# Patient Record
Sex: FEMALE | Race: WHITE | NOT HISPANIC OR LATINO | URBAN - METROPOLITAN AREA
[De-identification: names, ages, dates, MRNs, and addresses within clinical notes are randomized per-mention and may not be internally consistent; named-entity substitution may affect disease eponyms.]

---

## 2017-06-26 ENCOUNTER — OUTPATIENT (OUTPATIENT)
Dept: OUTPATIENT SERVICES | Facility: HOSPITAL | Age: 52
LOS: 1 days | End: 2017-06-26

## 2017-06-26 DIAGNOSIS — R07.9 CHEST PAIN, UNSPECIFIED: ICD-10-CM

## 2022-02-13 ENCOUNTER — EMERGENCY (EMERGENCY)
Facility: HOSPITAL | Age: 57
LOS: 1 days | Discharge: HOME | End: 2022-02-13
Attending: EMERGENCY MEDICINE | Admitting: EMERGENCY MEDICINE
Payer: COMMERCIAL

## 2022-02-13 VITALS
SYSTOLIC BLOOD PRESSURE: 138 MMHG | HEART RATE: 61 BPM | OXYGEN SATURATION: 100 % | RESPIRATION RATE: 16 BRPM | DIASTOLIC BLOOD PRESSURE: 81 MMHG

## 2022-02-13 VITALS
SYSTOLIC BLOOD PRESSURE: 142 MMHG | OXYGEN SATURATION: 100 % | RESPIRATION RATE: 18 BRPM | DIASTOLIC BLOOD PRESSURE: 80 MMHG | HEART RATE: 55 BPM | WEIGHT: 184.97 LBS | TEMPERATURE: 97 F | HEIGHT: 69 IN

## 2022-02-13 DIAGNOSIS — R10.13 EPIGASTRIC PAIN: ICD-10-CM

## 2022-02-13 DIAGNOSIS — R42 DIZZINESS AND GIDDINESS: ICD-10-CM

## 2022-02-13 DIAGNOSIS — K80.20 CALCULUS OF GALLBLADDER WITHOUT CHOLECYSTITIS WITHOUT OBSTRUCTION: ICD-10-CM

## 2022-02-13 DIAGNOSIS — R06.4 HYPERVENTILATION: ICD-10-CM

## 2022-02-13 DIAGNOSIS — Z20.822 CONTACT WITH AND (SUSPECTED) EXPOSURE TO COVID-19: ICD-10-CM

## 2022-02-13 LAB
ALBUMIN SERPL ELPH-MCNC: 3.9 G/DL — SIGNIFICANT CHANGE UP (ref 3.3–5)
ALP SERPL-CCNC: 141 U/L — HIGH (ref 40–120)
ALT FLD-CCNC: 25 U/L — SIGNIFICANT CHANGE UP (ref 10–45)
ANION GAP SERPL CALC-SCNC: 14 MMOL/L — SIGNIFICANT CHANGE UP (ref 5–17)
APTT BLD: 31.8 SEC — SIGNIFICANT CHANGE UP (ref 27.5–35.5)
AST SERPL-CCNC: 25 U/L — SIGNIFICANT CHANGE UP (ref 10–40)
BASOPHILS # BLD AUTO: 0.03 K/UL — SIGNIFICANT CHANGE UP (ref 0–0.2)
BASOPHILS NFR BLD AUTO: 0.3 % — SIGNIFICANT CHANGE UP (ref 0–2)
BILIRUB SERPL-MCNC: 0.3 MG/DL — SIGNIFICANT CHANGE UP (ref 0.2–1.2)
BLD GP AB SCN SERPL QL: NEGATIVE — SIGNIFICANT CHANGE UP
BUN SERPL-MCNC: 11 MG/DL — SIGNIFICANT CHANGE UP (ref 7–23)
CALCIUM SERPL-MCNC: 9.7 MG/DL — SIGNIFICANT CHANGE UP (ref 8.4–10.5)
CHLORIDE SERPL-SCNC: 102 MMOL/L — SIGNIFICANT CHANGE UP (ref 96–108)
CO2 SERPL-SCNC: 23 MMOL/L — SIGNIFICANT CHANGE UP (ref 22–31)
CREAT SERPL-MCNC: 1 MG/DL — SIGNIFICANT CHANGE UP (ref 0.5–1.3)
EOSINOPHIL # BLD AUTO: 0.02 K/UL — SIGNIFICANT CHANGE UP (ref 0–0.5)
EOSINOPHIL NFR BLD AUTO: 0.2 % — SIGNIFICANT CHANGE UP (ref 0–6)
GLUCOSE SERPL-MCNC: 192 MG/DL — HIGH (ref 70–99)
HCT VFR BLD CALC: 42.9 % — SIGNIFICANT CHANGE UP (ref 34.5–45)
HGB BLD-MCNC: 14.1 G/DL — SIGNIFICANT CHANGE UP (ref 11.5–15.5)
IMM GRANULOCYTES NFR BLD AUTO: 0.6 % — SIGNIFICANT CHANGE UP (ref 0–1.5)
INR BLD: 1.12 — SIGNIFICANT CHANGE UP (ref 0.88–1.16)
LIDOCAIN IGE QN: 46 U/L — SIGNIFICANT CHANGE UP (ref 7–60)
LYMPHOCYTES # BLD AUTO: 1.53 K/UL — SIGNIFICANT CHANGE UP (ref 1–3.3)
LYMPHOCYTES # BLD AUTO: 13.9 % — SIGNIFICANT CHANGE UP (ref 13–44)
MCHC RBC-ENTMCNC: 29.3 PG — SIGNIFICANT CHANGE UP (ref 27–34)
MCHC RBC-ENTMCNC: 32.9 GM/DL — SIGNIFICANT CHANGE UP (ref 32–36)
MCV RBC AUTO: 89.2 FL — SIGNIFICANT CHANGE UP (ref 80–100)
MONOCYTES # BLD AUTO: 0.41 K/UL — SIGNIFICANT CHANGE UP (ref 0–0.9)
MONOCYTES NFR BLD AUTO: 3.7 % — SIGNIFICANT CHANGE UP (ref 2–14)
NEUTROPHILS # BLD AUTO: 8.94 K/UL — HIGH (ref 1.8–7.4)
NEUTROPHILS NFR BLD AUTO: 81.3 % — HIGH (ref 43–77)
NRBC # BLD: 0 /100 WBCS — SIGNIFICANT CHANGE UP (ref 0–0)
PLATELET # BLD AUTO: 318 K/UL — SIGNIFICANT CHANGE UP (ref 150–400)
POTASSIUM SERPL-MCNC: 3.7 MMOL/L — SIGNIFICANT CHANGE UP (ref 3.5–5.3)
POTASSIUM SERPL-SCNC: 3.7 MMOL/L — SIGNIFICANT CHANGE UP (ref 3.5–5.3)
PROT SERPL-MCNC: 7.3 G/DL — SIGNIFICANT CHANGE UP (ref 6–8.3)
PROTHROM AB SERPL-ACNC: 13.4 SEC — SIGNIFICANT CHANGE UP (ref 10.6–13.6)
RBC # BLD: 4.81 M/UL — SIGNIFICANT CHANGE UP (ref 3.8–5.2)
RBC # FLD: 12.9 % — SIGNIFICANT CHANGE UP (ref 10.3–14.5)
RH IG SCN BLD-IMP: NEGATIVE — SIGNIFICANT CHANGE UP
SARS-COV-2 RNA SPEC QL NAA+PROBE: NEGATIVE — SIGNIFICANT CHANGE UP
SODIUM SERPL-SCNC: 139 MMOL/L — SIGNIFICANT CHANGE UP (ref 135–145)
TROPONIN T SERPL-MCNC: <0.01 NG/ML — SIGNIFICANT CHANGE UP (ref 0–0.01)
WBC # BLD: 11 K/UL — HIGH (ref 3.8–10.5)
WBC # FLD AUTO: 11 K/UL — HIGH (ref 3.8–10.5)

## 2022-02-13 PROCEDURE — 84484 ASSAY OF TROPONIN QUANT: CPT

## 2022-02-13 PROCEDURE — 76705 ECHO EXAM OF ABDOMEN: CPT

## 2022-02-13 PROCEDURE — 85025 COMPLETE CBC W/AUTO DIFF WBC: CPT

## 2022-02-13 PROCEDURE — 99285 EMERGENCY DEPT VISIT HI MDM: CPT | Mod: 25

## 2022-02-13 PROCEDURE — 86900 BLOOD TYPING SEROLOGIC ABO: CPT

## 2022-02-13 PROCEDURE — 96374 THER/PROPH/DIAG INJ IV PUSH: CPT

## 2022-02-13 PROCEDURE — 85610 PROTHROMBIN TIME: CPT

## 2022-02-13 PROCEDURE — 71045 X-RAY EXAM CHEST 1 VIEW: CPT

## 2022-02-13 PROCEDURE — 93005 ELECTROCARDIOGRAM TRACING: CPT

## 2022-02-13 PROCEDURE — 93010 ELECTROCARDIOGRAM REPORT: CPT | Mod: 59

## 2022-02-13 PROCEDURE — 71045 X-RAY EXAM CHEST 1 VIEW: CPT | Mod: 26

## 2022-02-13 PROCEDURE — 85730 THROMBOPLASTIN TIME PARTIAL: CPT

## 2022-02-13 PROCEDURE — 36415 COLL VENOUS BLD VENIPUNCTURE: CPT

## 2022-02-13 PROCEDURE — 87635 SARS-COV-2 COVID-19 AMP PRB: CPT

## 2022-02-13 PROCEDURE — 76705 ECHO EXAM OF ABDOMEN: CPT | Mod: 26

## 2022-02-13 PROCEDURE — 80053 COMPREHEN METABOLIC PANEL: CPT

## 2022-02-13 PROCEDURE — 86850 RBC ANTIBODY SCREEN: CPT

## 2022-02-13 PROCEDURE — 96375 TX/PRO/DX INJ NEW DRUG ADDON: CPT

## 2022-02-13 PROCEDURE — 83690 ASSAY OF LIPASE: CPT

## 2022-02-13 PROCEDURE — 86901 BLOOD TYPING SEROLOGIC RH(D): CPT

## 2022-02-13 RX ORDER — ACETAMINOPHEN 500 MG
650 TABLET ORAL ONCE
Refills: 0 | Status: COMPLETED | OUTPATIENT
Start: 2022-02-13 | End: 2022-02-13

## 2022-02-13 RX ORDER — PANTOPRAZOLE SODIUM 20 MG/1
40 TABLET, DELAYED RELEASE ORAL ONCE
Refills: 0 | Status: COMPLETED | OUTPATIENT
Start: 2022-02-13 | End: 2022-02-13

## 2022-02-13 RX ORDER — SODIUM CHLORIDE 9 MG/ML
1000 INJECTION INTRAMUSCULAR; INTRAVENOUS; SUBCUTANEOUS ONCE
Refills: 0 | Status: COMPLETED | OUTPATIENT
Start: 2022-02-13 | End: 2022-02-13

## 2022-02-13 RX ORDER — ONDANSETRON 8 MG/1
4 TABLET, FILM COATED ORAL ONCE
Refills: 0 | Status: COMPLETED | OUTPATIENT
Start: 2022-02-13 | End: 2022-02-13

## 2022-02-13 RX ADMIN — ONDANSETRON 4 MILLIGRAM(S): 8 TABLET, FILM COATED ORAL at 01:55

## 2022-02-13 RX ADMIN — PANTOPRAZOLE SODIUM 40 MILLIGRAM(S): 20 TABLET, DELAYED RELEASE ORAL at 01:55

## 2022-02-13 RX ADMIN — Medication 30 MILLILITER(S): at 01:55

## 2022-02-13 RX ADMIN — SODIUM CHLORIDE 1000 MILLILITER(S): 9 INJECTION INTRAMUSCULAR; INTRAVENOUS; SUBCUTANEOUS at 01:55

## 2022-02-13 RX ADMIN — Medication 650 MILLIGRAM(S): at 01:55

## 2022-02-13 NOTE — CONSULT NOTE ADULT - ASSESSMENT
56F with pmh of GERD, hiatal hernia, IBS-C and psh of dx laparoscopy who presents from home with acute on chronic epigastric pain with associated nausea and vomiting. Vitally wnl, with labs wnl- including TBili 0.3, Elevated alkaline phosphatase suggestive of possible biliary obstruction seen on Abd US. Considered on the differential is symptomatic cholelithiasis (non-mobile 1.4cm stone) vs choledocholithiasis.     Patient offered laparoscopic cholecystectomy during this admission vs outpt f/u for elective procedure and elected the later  Recommend outpatient follow up with Dr. Boss for MRCP and laparoscopic cholecystectomy  Plan was discussed with attending, ED physician, and patient and all were in agreement  Ok to discharge from surgical perspective

## 2022-02-13 NOTE — CONSULT NOTE ADULT - NSCONSULTADDITIONALINFOA_GEN_ALL_CORE
ACS Attending: Discussed with Dr. Tavarez. Have recommended admission and treatment. Patient wishes to pursue treatment as outpatient.

## 2022-02-13 NOTE — ED ADULT NURSE REASSESSMENT NOTE - NS ED NURSE REASSESS COMMENT FT1
IV has been initiated, labs drawn and sent along with covid swab, medicated by PIPPA Irwin with IVF, walked to US.

## 2022-02-13 NOTE — ED ADULT NURSE NOTE - OBJECTIVE STATEMENT
Pt presents with c/o "upper epigastric pain", described as squeezing sensation per pt, sudden onset at approx 2200. Pt reports significant hx of GERD, unable to take meds after onset of s/s secondary to "N/V" per pt. Pt presents very anxious, states "I am hyperventilating". During assessment, pt reports previous dx of hiatal hernia, but states current s/s are consistent with GERD. Denies any specific "chest pain" or other associated cardiac complaints.

## 2022-02-13 NOTE — ED PROVIDER NOTE - NSFOLLOWUPINSTRUCTIONS_ED_ALL_ED_FT
You ultrasound shows gallstones - some of which are stick in the neck of the gallbladder. Additionally some of ducts are dilated. You preferred to follow up outside the hospital.     Can take tylenol 650mg every 6hrs as needed for mild pain.    Avoid fried/fatty food.     Stay well hydrated.    Return for any fevers, persistent vomit, worsening pain, worsening breathing, worsening lightheaded.    Follow up with primary doctor within 1-2 days.     Follow up with general surgeon Dr. Boss.       Gallstones    Gallstones (cholelithiasis) is a form of gallbladder disease in which stones form in your gallbladder. The gallbladder is an organ that stores bile made in the liver, which helps digest fats. Gallstones begin as small bile crystals and slowly grow into stones. Gallstone pain occurs when the gallbladder spasms and a gallstone or sludge is blocking the duct. Pain can also occur when a stone passes out of the duct. Only take over-the-counter or prescription medicines for pain, discomfort, or fever as directed by your health care provider. Follow a low-fat diet until seen again by your health care provider.     SEEK IMMEDIATE MEDICAL CARE IF YOU HAVE ANY OF THE FOLLOWING SYMPTOMS: worsening pain, fever, persistent vomiting, yellowing of the skin or eyes, or altered mental status.

## 2022-02-13 NOTE — ED ADULT TRIAGE NOTE - CHIEF COMPLAINT QUOTE
Pt presented to the ED with complaints of epigastric pain associated with nausea and vomiting that began 10pm last night.

## 2022-02-13 NOTE — ED PROVIDER NOTE - PATIENT PORTAL LINK FT
You can access the FollowMyHealth Patient Portal offered by [facility] by registering at the following website: [email or web address]. By joining [employer]’s FollowMyHealth portal, you will also be able to view your health information using other applications (apps) compatible with our system.

## 2022-02-13 NOTE — ED PROVIDER NOTE - PHYSICAL EXAMINATION
no LE edema, normal equal distal pulses, steady unassisted gait.   abd: soft, +epigastric/RUQ ttp, no rebound/guarding.

## 2022-02-13 NOTE — ED PROVIDER NOTE - CARE PROVIDER_API CALL
Villa Boss (MD)  Surgery  [address], [facility], [place] [address]  Phone: [phone]  Fax: [phone]  Follow Up Time:

## 2022-02-13 NOTE — CONSULT NOTE ADULT - SUBJECTIVE AND OBJECTIVE BOX
HPI:  56F with pmh of GERD, hiatal hernia, IBS-C and psh of dx laparoscopy who presents from home with acute on chronic epigastric pain with associated nausea and vomiting. Reports chronic history of epigastric discomfort 2/2 GERD that progressed in severity over the past two weeks. Reports episode of severe pain las Tuesday that was self-limiting and     In the ED initial vitals were as follows- Tmax 97.3, HR 61, /81, RR 16, SpO2 100%. Initial labs notable for mild leukocytosis 11 (PMNs predominance) and Alp 141. Remaining labs wnl including Tbili 0.3, lipase 46. Abd US notable for mild cholelithiasis including a nonmobile stone in the region of the gallbladder neck. Mild gallbladder dilatation. No wall thickening or pericholecystic fluid to suggest acute cholecystitis.       PMH: GERD, hiatal hernia, IBS-C  PSH: Diagnostic laparoscopy for endometriosis  Allergies: NKDA  Medications: Linsezz  SH: No h/o tobacco use. Couple drinks 3-4xs/week. No h/o recreational drug use  FH: Father with cholelithiasis        Vital Signs Last 24 Hrs  T(C): 36.3 (13 Feb 2022 01:16), Max: 36.3 (13 Feb 2022 01:16)  T(F): 97.3 (13 Feb 2022 01:16), Max: 97.3 (13 Feb 2022 01:16)  HR: 61 (13 Feb 2022 04:10) (55 - 61)  BP: 138/81 (13 Feb 2022 04:10) (138/81 - 142/80)  BP(mean): --  RR: 16 (13 Feb 2022 04:10) (16 - 18)  SpO2: 100% (13 Feb 2022 04:10) (100% - 100%)  I&O's Detail    PHYSICAL EXAM  General: NAD, resting comfortably in bed  C/V: NSR  Pulm: Nonlabored breathing, no respiratory distress  Abd: soft, nondistende, NTTP. No rebound or guarding. Cole's negative  Extrem: WWP, no edema  Skin: warm, no rashes  Psych: anxious, appropriate        LABS:                        14.1   11.00 )-----------( 318      ( 13 Feb 2022 01:43 )             42.9     02-13    139  |  102  |  11  ----------------------------<  192<H>  3.7   |  23  |  1.00    Ca    9.7      13 Feb 2022 01:43    TPro  7.3  /  Alb  3.9  /  TBili  0.3  /  DBili  x   /  AST  25  /  ALT  25  /  AlkPhos  141<H>  02-13    PT/INR - ( 13 Feb 2022 04:14 )   PT: 13.4 sec;   INR: 1.12          PTT - ( 13 Feb 2022 04:14 )  PTT:31.8 sec      RADIOLOGY & ADDITIONAL STUDIES:  < from: US Abdomen Limited (02.13.22 @ 02:19) >  ACC: [id number] EXAM:  US ABDOMEN LIMITED                          PROCEDURE DATE:  02/13/2022          INTERPRETATION:  Right upper quadrant ultrasound    History: Epigastric pain and tenderness to palpation. Evaluate for   cholecystitis.    Prior studies: None.    Findings:    Liver: Normal size. Normal echogenicity. 0.8 cm cyst in the right lobe.    Bile ducts: No intrahepatic biliary ductal dilatation. The common duct is   dilated, measuring up to 1.0 cm distally.    Gallbladder: Few stones, including a 1.4 cm nonmobile in the region of   the gallbladder neck. Mild gallbladder dilatation measuring 11 cm in   length and 4 cm in transverse diameter. No wall thickening. No   pericholecystic fluid. Unable to assess for sonographic Cole's sign   because patient had received prior analgesia.    Pancreas: The visualized portions appear normal.    Right kidney: Normal size, measuring 11.2 cm in length. Normal renal   parenchymal thickness and echogenicity.  No hydronephrosis.  No renal   mass. No renal stone.    Ascites: No ascites in the right upper quadrant.    Vessels: The proximal portions of the aorta and inferior vena cava are   unremarkable. Normal hepatopetal blood flow demonstrated within main   portal vein. Hepatic veins are patent.    Impression:  1.  Mild cholelithiasis including a nonmobile stone in the region of the   gallbladder neck. Mild gallbladder dilatation. No wall thickening or   pericholecystic fluid to suggest acute cholecystitis. Unable to assess   for sonographic Cole's sign due to prior analgesia administration.   Consider further evaluation with nuclear hepatobiliary scan as clinically   warranted.  2.  Common bile duct dilatation up to 10 mm. Nonemergent follow-up with   MRCP is suggested.    --- End of Report ---          FRACISCO RAPP MD; Resident Radiologist  This document has been electronically signed.  JULIA LAM MD; Attending Radiologist  This document has been electronically signed. Feb 13 2022  3:12AM    < end of copied text >       HPI:  56F with pmh of GERD, hiatal hernia, IBS-C and psh of dx laparoscopy who presents from home with acute on chronic epigastric pain with associated nausea and vomiting. Reports chronic history of epigastric discomfort 2/2 GERD that progressed in severity over the past two weeks. Reports episode of severe pain last Tuesday that was self-limiting. Has repeat episode of severe pain yesterday evening prompting visit to the ED. Pain was constant, associated with NBNB emesis. Denies f/c, CP, SOB. Last BM was yesterday, loose and light brown. Denies hematuria or dysuria. Underwent EGD/colonoscopy 2 years ago which was notable for hiatal hernia, and colonic polyps x2.     In the ED initial vitals were as follows- Tmax 97.3, HR 61, /81, RR 16, SpO2 100%. Initial labs notable for mild leukocytosis 11 (PMNs predominance) and Alp 141. Remaining labs wnl including Tbili 0.3, lipase 46. Abd US notable for mild cholelithiasis including a nonmobile stone in the region of the gallbladder neck. Mild gallbladder dilatation. No wall thickening or pericholecystic fluid to suggest acute cholecystitis.       PMH: GERD, hiatal hernia, IBS-C  PSH: Diagnostic laparoscopy for endometriosis  Allergies: NKDA  Medications: Linsezz  SH: No h/o tobacco use. Couple drinks 3-4xs/week. No h/o recreational drug use  FH: Father with cholelithiasis        Vital Signs Last 24 Hrs  T(C): 36.3 (13 Feb 2022 01:16), Max: 36.3 (13 Feb 2022 01:16)  T(F): 97.3 (13 Feb 2022 01:16), Max: 97.3 (13 Feb 2022 01:16)  HR: 61 (13 Feb 2022 04:10) (55 - 61)  BP: 138/81 (13 Feb 2022 04:10) (138/81 - 142/80)  BP(mean): --  RR: 16 (13 Feb 2022 04:10) (16 - 18)  SpO2: 100% (13 Feb 2022 04:10) (100% - 100%)  I&O's Detail    PHYSICAL EXAM  General: NAD, resting comfortably in bed  C/V: NSR  Pulm: Nonlabored breathing, no respiratory distress  Abd: soft, nondistende, NTTP. No rebound or guarding. Cole's negative  Extrem: WWP, no edema  Skin: warm, no rashes  Psych: anxious, appropriate        LABS:                        14.1   11.00 )-----------( 318      ( 13 Feb 2022 01:43 )             42.9     02-13    139  |  102  |  11  ----------------------------<  192<H>  3.7   |  23  |  1.00    Ca    9.7      13 Feb 2022 01:43    TPro  7.3  /  Alb  3.9  /  TBili  0.3  /  DBili  x   /  AST  25  /  ALT  25  /  AlkPhos  141<H>  02-13    PT/INR - ( 13 Feb 2022 04:14 )   PT: 13.4 sec;   INR: 1.12          PTT - ( 13 Feb 2022 04:14 )  PTT:31.8 sec      RADIOLOGY & ADDITIONAL STUDIES:  < from: US Abdomen Limited (02.13.22 @ 02:19) >  ACC: [id number] EXAM:  US ABDOMEN LIMITED                          PROCEDURE DATE:  02/13/2022          INTERPRETATION:  Right upper quadrant ultrasound    History: Epigastric pain and tenderness to palpation. Evaluate for   cholecystitis.    Prior studies: None.    Findings:    Liver: Normal size. Normal echogenicity. 0.8 cm cyst in the right lobe.    Bile ducts: No intrahepatic biliary ductal dilatation. The common duct is   dilated, measuring up to 1.0 cm distally.    Gallbladder: Few stones, including a 1.4 cm nonmobile in the region of   the gallbladder neck. Mild gallbladder dilatation measuring 11 cm in   length and 4 cm in transverse diameter. No wall thickening. No   pericholecystic fluid. Unable to assess for sonographic Cole's sign   because patient had received prior analgesia.    Pancreas: The visualized portions appear normal.    Right kidney: Normal size, measuring 11.2 cm in length. Normal renal   parenchymal thickness and echogenicity.  No hydronephrosis.  No renal   mass. No renal stone.    Ascites: No ascites in the right upper quadrant.    Vessels: The proximal portions of the aorta and inferior vena cava are   unremarkable. Normal hepatopetal blood flow demonstrated within main   portal vein. Hepatic veins are patent.    Impression:  1.  Mild cholelithiasis including a nonmobile stone in the region of the   gallbladder neck. Mild gallbladder dilatation. No wall thickening or   pericholecystic fluid to suggest acute cholecystitis. Unable to assess   for sonographic Cole's sign due to prior analgesia administration.   Consider further evaluation with nuclear hepatobiliary scan as clinically   warranted.  2.  Common bile duct dilatation up to 10 mm. Nonemergent follow-up with   MRCP is suggested.    --- End of Report ---          FRACISCO RAPP MD; Resident Radiologist  This document has been electronically signed.  JULIA LAM MD; Attending Radiologist  This document has been electronically signed. Feb 13 2022  3:12AM    < end of copied text >

## 2022-02-13 NOTE — ED PROVIDER NOTE - CLINICAL SUMMARY MEDICAL DECISION MAKING FREE TEXT BOX
56F PMH hiatal hernia p/w epigastric pain, began ~2130, gradual onset, non-radiating, similar to prior but lasting longer, last episode ~1w ago. +NBNB NV. Feels like she is hyperventilating, feels that her mouth is dry and has slight lightheaded. No other systemic symptoms. Last EGD ~2yrs ago, reportedly showing hiatal hernia but otherwise wnl.   Vitals wnl, exam as above.   ddx: Likely GERD/gastritis/PUD vs. viv vs. less likely atypical ACS.   Labs, EKG, CXR, US, IVF/symptom control, reassess.

## 2022-02-13 NOTE — ED PROVIDER NOTE - PROGRESS NOTE DETAILS
Klepfish: WBC 11, glucose 192, alk phos 141, other labs grosslhy wnl. COVID neg. US prelim showing "Impression: 1. Cholelithiasis with nonmobile gallstones in the gallbladder neck causing mild gallbladder distention. 2. Common bile duct dilatation which may be secondary to choledocholithiasis, not well seen in this study. MRCP/ERCP can be performed for further evaluation. 3. No cholecystitis." Pt now though asymptomatic, abd soft NTND. Will continue to observe in ED, reassess. Tom: pt has no current pain - however given prior pain/NV and US findings, surgery consulted. Updated pt. Klepfish: asymptomatic since initial eval/pain meds. Evaluated by surgery, given US findings admission was recommended. Pt prefers outpt f/u. Has clinical capacity. Understands risks of possible blockage/developing infection.   Discussed importance of outpt follow up and return precautions. Clinically no indication for further emergent ED workup or hospitalization at this time. Comfortable for dc, outpt f/u.

## 2022-02-13 NOTE — ED PROVIDER NOTE - OBJECTIVE STATEMENT
56F PMH hiatal hernia p/w epigastric pain, began ~2130, gradual onset, non-radiating, similar to prior but lasting longer, last episode ~1w ago. +NBNB NV. Feels like she is hyperventilating, feels that her mouth is dry and has slight lightheaded. No other systemic symptoms. Last EGD ~2yrs ago, reportedly showing hiatal hernia but otherwise wnl.   Denies fevers, chills, diarrhea, black stool, bloody stool, dysuria, hematuria, urinary frequency, focal weakness/numbness, back pain, rashes, joint pains, recent travel, recent antibiotic use, sick contacts, SOB, CP, rhinorrhea, nasal congestion, sore throat, cough.   meds: Linzess PRN for constipation

## 2022-02-28 ENCOUNTER — APPOINTMENT (OUTPATIENT)
Dept: SURGERY | Facility: CLINIC | Age: 57
End: 2022-02-28
Payer: COMMERCIAL

## 2022-02-28 VITALS
OXYGEN SATURATION: 98 % | DIASTOLIC BLOOD PRESSURE: 78 MMHG | BODY MASS INDEX: 27.01 KG/M2 | HEART RATE: 64 BPM | WEIGHT: 182.38 LBS | TEMPERATURE: 96.5 F | HEIGHT: 69 IN | SYSTOLIC BLOOD PRESSURE: 135 MMHG

## 2022-02-28 DIAGNOSIS — K80.20 CALCULUS OF GALLBLADDER W/OUT CHOLECYSTITIS W/OUT OBSTRUCTION: ICD-10-CM

## 2022-02-28 DIAGNOSIS — K83.8 OTHER SPECIFIED DISEASES OF BILIARY TRACT: ICD-10-CM

## 2022-02-28 DIAGNOSIS — K83.1 OBSTRUCTION OF BILE DUCT: ICD-10-CM

## 2022-02-28 PROCEDURE — 99214 OFFICE O/P EST MOD 30 MIN: CPT

## 2022-03-03 ENCOUNTER — OUTPATIENT (OUTPATIENT)
Dept: OUTPATIENT SERVICES | Facility: HOSPITAL | Age: 57
LOS: 1 days | End: 2022-03-03
Payer: COMMERCIAL

## 2022-03-03 ENCOUNTER — RESULT REVIEW (OUTPATIENT)
Age: 57
End: 2022-03-03

## 2022-03-03 ENCOUNTER — APPOINTMENT (OUTPATIENT)
Dept: SURGERY | Facility: CLINIC | Age: 57
End: 2022-03-03
Payer: COMMERCIAL

## 2022-03-03 VITALS
HEIGHT: 69 IN | TEMPERATURE: 97.6 F | OXYGEN SATURATION: 99 % | WEIGHT: 183 LBS | HEART RATE: 61 BPM | DIASTOLIC BLOOD PRESSURE: 71 MMHG | SYSTOLIC BLOOD PRESSURE: 103 MMHG | BODY MASS INDEX: 27.11 KG/M2

## 2022-03-03 DIAGNOSIS — Z82.3 FAMILY HISTORY OF STROKE: ICD-10-CM

## 2022-03-03 DIAGNOSIS — Z87.42 PERSONAL HISTORY OF OTHER DISEASES OF THE FEMALE GENITAL TRACT: ICD-10-CM

## 2022-03-03 DIAGNOSIS — K21.9 GASTRO-ESOPHAGEAL REFLUX DISEASE W/OUT ESOPHAGITIS: ICD-10-CM

## 2022-03-03 PROCEDURE — 99205 OFFICE O/P NEW HI 60 MIN: CPT

## 2022-03-03 PROCEDURE — 99215 OFFICE O/P EST HI 40 MIN: CPT

## 2022-03-03 PROCEDURE — 71046 X-RAY EXAM CHEST 2 VIEWS: CPT

## 2022-03-03 PROCEDURE — 71046 X-RAY EXAM CHEST 2 VIEWS: CPT | Mod: 26

## 2022-03-07 ENCOUNTER — OUTPATIENT (OUTPATIENT)
Dept: OUTPATIENT SERVICES | Facility: HOSPITAL | Age: 57
LOS: 1 days | Discharge: HOME | End: 2022-03-07
Payer: COMMERCIAL

## 2022-03-07 PROCEDURE — 91010 ESOPHAGUS MOTILITY STUDY: CPT | Mod: 26

## 2022-03-07 PROCEDURE — 91013 ESOPHGL MOTIL W/STIM/PERFUS: CPT | Mod: 26

## 2022-03-07 PROCEDURE — 91010 ESOPHAGUS MOTILITY STUDY: CPT

## 2022-03-08 ENCOUNTER — APPOINTMENT (OUTPATIENT)
Dept: RADIOLOGY | Facility: HOSPITAL | Age: 57
End: 2022-03-08
Payer: COMMERCIAL

## 2022-03-08 ENCOUNTER — OUTPATIENT (OUTPATIENT)
Dept: OUTPATIENT SERVICES | Facility: HOSPITAL | Age: 57
LOS: 1 days | End: 2022-03-08
Payer: COMMERCIAL

## 2022-03-08 PROBLEM — K83.1 OBSTRUCTION OF BILE DUCT: Status: ACTIVE | Noted: 2022-02-28

## 2022-03-08 PROCEDURE — 74240 X-RAY XM UPR GI TRC 1CNTRST: CPT

## 2022-03-08 PROCEDURE — 74240 X-RAY XM UPR GI TRC 1CNTRST: CPT | Mod: 26

## 2022-03-08 NOTE — HISTORY OF PRESENT ILLNESS
[de-identified] : This is a 55 y/o female presenting to the office for evaluation and management of a large hiatal hernia. Reports she has had the hernia for many years with no symptoms. She reports she has had symptoms of GERD for many years which was relieved with dietary changes. In the past few months she has started to have symptoms of regurgitation with chest pain. Denies any shortness of breath or trouble breathing. Does not take any medication. Pt also recently had a ultrasound which revealed large enlarged bile duct and gallstones. Denies any fever, abdominal pain, nausea or vomiting.

## 2022-03-08 NOTE — ASSESSMENT
[FreeTextEntry1] : Case discussed/pt seen by attending, . 55 y/o female with large hiatal hernia, cholelithiasis and CBD dilation. Discussed plan for pt to undergo an MRCP to evaluate for bile duct obstruction. Will call pt with results. Discussed referral to  for further evaluation/management of hiatal hernia. All questions answered. Pt scheduled to see  this week. Notably greater than 50% of today's 30 minute initial visit was spent on counseling and coordination of her care.

## 2022-03-08 NOTE — DATA REVIEWED
[FreeTextEntry1] : EGD (12/4/15): Large hitatus hernia\par \par Ultrasound (2/13/22):\par 1. Mild cholelithiasis including a nonmobile stone in the region of the gallbladder neck. Mild gallbladder dilatation. No wall thickening or pericholecystic fluid to suggest acute cholecystitis. Unable to assess for sonographic Cole's sign due to prior analgesia administration. Consider further evaluation with nuclear hepatobiliary scan as clinically warranted.\par 2. Common bile duct dilatation up to 10 mm. Nonemergent follow-up with MRCP is suggested.

## 2022-03-08 NOTE — PHYSICAL EXAM
[Alert] : alert [Oriented to Person] : oriented to person [Oriented to Place] : oriented to place [Oriented to Time] : oriented to time [Calm] : calm [Abdominal Masses] : No abdominal masses [Abdomen Tenderness] : ~T ~M No abdominal tenderness [Tender] : was nontender [Enlarged] : not enlarged [de-identified] : NAD, comfortable [de-identified] : Normocephalic, atraumatic. No scleral icterus.  [de-identified] : Supple, no JVD or cervical lymphadenopathy.  [de-identified] : No respiratory distress.  [de-identified] : +BS soft, nontender, nondistended. Well healed prior incisions. \par

## 2022-03-22 ENCOUNTER — APPOINTMENT (OUTPATIENT)
Dept: MRI IMAGING | Facility: HOSPITAL | Age: 57
End: 2022-03-22

## 2022-03-22 ENCOUNTER — OUTPATIENT (OUTPATIENT)
Dept: OUTPATIENT SERVICES | Facility: HOSPITAL | Age: 57
LOS: 1 days | End: 2022-03-22
Payer: COMMERCIAL

## 2022-03-22 PROCEDURE — 74181 MRI ABDOMEN W/O CONTRAST: CPT | Mod: 26

## 2022-03-22 PROCEDURE — 74181 MRI ABDOMEN W/O CONTRAST: CPT

## 2022-04-18 DIAGNOSIS — K80.20 CALCULUS OF GALLBLADDER W/OUT CHOLECYSTITIS W/OUT OBSTRUCTION: ICD-10-CM

## 2022-04-18 DIAGNOSIS — K44.9 DIAPHRAGMATIC HERNIA W/OUT OBSTRUCTION OR GANGRENE: ICD-10-CM

## 2022-04-18 DIAGNOSIS — Z00.00 ENCOUNTER FOR GENERAL ADULT MEDICAL EXAMINATION W/OUT ABNORMAL FINDINGS: ICD-10-CM

## 2022-04-18 LAB — SARS-COV-2 N GENE NPH QL NAA+PROBE: NOT DETECTED

## 2022-04-20 LAB
ALBUMIN SERPL ELPH-MCNC: 3.9 G/DL
ALP BLD-CCNC: 127 U/L
ALT SERPL-CCNC: 32 U/L
ANION GAP SERPL CALC-SCNC: 13 MMOL/L
AST SERPL-CCNC: 34 U/L
BILIRUB SERPL-MCNC: 0.4 MG/DL
BUN SERPL-MCNC: 6 MG/DL
CALCIUM SERPL-MCNC: 9.1 MG/DL
CHLORIDE SERPL-SCNC: 106 MMOL/L
CO2 SERPL-SCNC: 24 MMOL/L
CREAT SERPL-MCNC: 0.96 MG/DL
EGFR: 69 ML/MIN/1.73M2
GLUCOSE SERPL-MCNC: 97 MG/DL
POTASSIUM SERPL-SCNC: 4.2 MMOL/L
PROT SERPL-MCNC: 6.5 G/DL
SODIUM SERPL-SCNC: 142 MMOL/L

## 2022-04-25 ENCOUNTER — LABORATORY RESULT (OUTPATIENT)
Age: 57
End: 2022-04-25

## 2022-04-25 ENCOUNTER — NON-APPOINTMENT (OUTPATIENT)
Age: 57
End: 2022-04-25

## 2022-04-26 VITALS
SYSTOLIC BLOOD PRESSURE: 122 MMHG | HEIGHT: 69 IN | RESPIRATION RATE: 16 BRPM | WEIGHT: 177.47 LBS | TEMPERATURE: 98 F | OXYGEN SATURATION: 99 % | DIASTOLIC BLOOD PRESSURE: 78 MMHG | HEART RATE: 70 BPM

## 2022-04-26 NOTE — ASU PATIENT PROFILE, ADULT - NSICDXPASTMEDICALHX_GEN_ALL_CORE_FT
PAST MEDICAL HISTORY:  Gallstones     GERD (gastroesophageal reflux disease)     H/O constipation     Hypercholesteremia

## 2022-04-26 NOTE — ASU PATIENT PROFILE, ADULT - FALL HARM RISK - UNIVERSAL INTERVENTIONS
Bed in lowest position, wheels locked, appropriate side rails in place/Call bell, personal items and telephone in reach/Instruct patient to call for assistance before getting out of bed or chair/Non-slip footwear when patient is out of bed/[place] to call system/Physically safe environment - no spills, clutter or unnecessary equipment/Purposeful Proactive Rounding/Room/bathroom lighting operational, light cord in reach

## 2022-04-27 ENCOUNTER — APPOINTMENT (OUTPATIENT)
Dept: SURGERY | Facility: HOSPITAL | Age: 57
End: 2022-04-27

## 2022-04-27 ENCOUNTER — RESULT REVIEW (OUTPATIENT)
Age: 57
End: 2022-04-27

## 2022-04-27 ENCOUNTER — INPATIENT (INPATIENT)
Facility: HOSPITAL | Age: 57
LOS: 0 days | Discharge: HOME | DRG: 419 | End: 2022-04-28
Attending: GENERAL ACUTE CARE HOSPITAL | Admitting: GENERAL ACUTE CARE HOSPITAL
Payer: COMMERCIAL

## 2022-04-27 ENCOUNTER — TRANSCRIPTION ENCOUNTER (OUTPATIENT)
Age: 57
End: 2022-04-27

## 2022-04-27 DIAGNOSIS — Z87.42 PERSONAL HISTORY OF OTHER DISEASES OF THE FEMALE GENITAL TRACT: Chronic | ICD-10-CM

## 2022-04-27 LAB
BLD GP AB SCN SERPL QL: NEGATIVE — SIGNIFICANT CHANGE UP
RH IG SCN BLD-IMP: NEGATIVE — SIGNIFICANT CHANGE UP

## 2022-04-27 PROCEDURE — 99222 1ST HOSP IP/OBS MODERATE 55: CPT | Mod: 57,25

## 2022-04-27 PROCEDURE — 88304 TISSUE EXAM BY PATHOLOGIST: CPT | Mod: 26

## 2022-04-27 PROCEDURE — 47562 LAPAROSCOPIC CHOLECYSTECTOMY: CPT | Mod: 22

## 2022-04-27 PROCEDURE — 43282 LAP PARAESOPH HER RPR W/MESH: CPT | Mod: 22

## 2022-04-27 PROCEDURE — 43235 EGD DIAGNOSTIC BRUSH WASH: CPT

## 2022-04-27 DEVICE — SURGICEL FIBRILLAR 1 X 2": Type: IMPLANTABLE DEVICE | Status: FUNCTIONAL

## 2022-04-27 DEVICE — MESH PHASIX ST 10X10CM: Type: IMPLANTABLE DEVICE | Status: FUNCTIONAL

## 2022-04-27 RX ORDER — ACETAMINOPHEN 500 MG
650 TABLET ORAL EVERY 6 HOURS
Refills: 0 | Status: DISCONTINUED | OUTPATIENT
Start: 2022-04-27 | End: 2022-04-27

## 2022-04-27 RX ORDER — SODIUM CHLORIDE 9 MG/ML
1000 INJECTION, SOLUTION INTRAVENOUS
Refills: 0 | Status: DISCONTINUED | OUTPATIENT
Start: 2022-04-27 | End: 2022-04-27

## 2022-04-27 RX ORDER — ONDANSETRON 8 MG/1
4 TABLET, FILM COATED ORAL EVERY 6 HOURS
Refills: 0 | Status: DISCONTINUED | OUTPATIENT
Start: 2022-04-27 | End: 2022-04-27

## 2022-04-27 RX ORDER — OXYCODONE HYDROCHLORIDE 5 MG/1
5 TABLET ORAL EVERY 6 HOURS
Refills: 0 | Status: DISCONTINUED | OUTPATIENT
Start: 2022-04-27 | End: 2022-04-28

## 2022-04-27 RX ORDER — ACETAMINOPHEN 500 MG
1000 TABLET ORAL ONCE
Refills: 0 | Status: COMPLETED | OUTPATIENT
Start: 2022-04-27 | End: 2022-04-27

## 2022-04-27 RX ORDER — METRONIDAZOLE 500 MG
500 TABLET ORAL EVERY 8 HOURS
Refills: 0 | Status: DISCONTINUED | OUTPATIENT
Start: 2022-04-27 | End: 2022-04-28

## 2022-04-27 RX ORDER — BUPIVACAINE 13.3 MG/ML
20 INJECTION, SUSPENSION, LIPOSOMAL INFILTRATION ONCE
Refills: 0 | Status: DISCONTINUED | OUTPATIENT
Start: 2022-04-27 | End: 2022-04-27

## 2022-04-27 RX ORDER — CEFTRIAXONE 500 MG/1
1000 INJECTION, POWDER, FOR SOLUTION INTRAMUSCULAR; INTRAVENOUS EVERY 24 HOURS
Refills: 0 | Status: DISCONTINUED | OUTPATIENT
Start: 2022-04-27 | End: 2022-04-28

## 2022-04-27 RX ORDER — BUPIVACAINE 13.3 MG/ML
20 INJECTION, SUSPENSION, LIPOSOMAL INFILTRATION ONCE
Refills: 0 | Status: DISCONTINUED | OUTPATIENT
Start: 2022-04-27 | End: 2022-04-28

## 2022-04-27 RX ORDER — SODIUM CHLORIDE 9 MG/ML
1000 INJECTION, SOLUTION INTRAVENOUS
Refills: 0 | Status: DISCONTINUED | OUTPATIENT
Start: 2022-04-27 | End: 2022-04-28

## 2022-04-27 RX ORDER — OXYCODONE HYDROCHLORIDE 5 MG/1
10 TABLET ORAL EVERY 6 HOURS
Refills: 0 | Status: DISCONTINUED | OUTPATIENT
Start: 2022-04-27 | End: 2022-04-27

## 2022-04-27 RX ORDER — ONDANSETRON 8 MG/1
4 TABLET, FILM COATED ORAL EVERY 6 HOURS
Refills: 0 | Status: DISCONTINUED | OUTPATIENT
Start: 2022-04-27 | End: 2022-04-28

## 2022-04-27 RX ORDER — HYDROMORPHONE HYDROCHLORIDE 2 MG/ML
0.5 INJECTION INTRAMUSCULAR; INTRAVENOUS; SUBCUTANEOUS
Refills: 0 | Status: DISCONTINUED | OUTPATIENT
Start: 2022-04-27 | End: 2022-04-27

## 2022-04-27 RX ORDER — OXYCODONE HYDROCHLORIDE 5 MG/1
10 TABLET ORAL EVERY 6 HOURS
Refills: 0 | Status: DISCONTINUED | OUTPATIENT
Start: 2022-04-27 | End: 2022-04-28

## 2022-04-27 RX ORDER — OXYCODONE HYDROCHLORIDE 5 MG/1
5 TABLET ORAL EVERY 6 HOURS
Refills: 0 | Status: DISCONTINUED | OUTPATIENT
Start: 2022-04-27 | End: 2022-04-27

## 2022-04-27 RX ORDER — HYDROMORPHONE HYDROCHLORIDE 2 MG/ML
0.5 INJECTION INTRAMUSCULAR; INTRAVENOUS; SUBCUTANEOUS
Refills: 0 | Status: DISCONTINUED | OUTPATIENT
Start: 2022-04-27 | End: 2022-04-28

## 2022-04-27 RX ADMIN — Medication 100 MILLIGRAM(S): at 21:44

## 2022-04-27 RX ADMIN — HYDROMORPHONE HYDROCHLORIDE 0.5 MILLIGRAM(S): 2 INJECTION INTRAMUSCULAR; INTRAVENOUS; SUBCUTANEOUS at 17:51

## 2022-04-27 RX ADMIN — OXYCODONE HYDROCHLORIDE 10 MILLIGRAM(S): 5 TABLET ORAL at 21:30

## 2022-04-27 RX ADMIN — CEFTRIAXONE 100 MILLIGRAM(S): 500 INJECTION, POWDER, FOR SOLUTION INTRAMUSCULAR; INTRAVENOUS at 18:05

## 2022-04-27 RX ADMIN — OXYCODONE HYDROCHLORIDE 10 MILLIGRAM(S): 5 TABLET ORAL at 20:46

## 2022-04-27 RX ADMIN — Medication 1000 MILLIGRAM(S): at 11:23

## 2022-04-27 RX ADMIN — Medication 1000 MILLIGRAM(S): at 11:24

## 2022-04-27 RX ADMIN — HYDROMORPHONE HYDROCHLORIDE 0.5 MILLIGRAM(S): 2 INJECTION INTRAMUSCULAR; INTRAVENOUS; SUBCUTANEOUS at 18:08

## 2022-04-27 NOTE — BRIEF OPERATIVE NOTE - NSICDXBRIEFPOSTOP_GEN_ALL_CORE_FT
POST-OP DIAGNOSIS:  Hiatal hernia with GERD 27-Apr-2022 17:14:51  Barbara Mai  Symptomatic cholelithiasis 27-Apr-2022 17:15:03  Barbara Mai

## 2022-04-27 NOTE — PACU DISCHARGE NOTE - COMMENTS
Pt A&ox4, operative site clean, dry and intact; denies pain at present. Plan of care endorsed to receiving PIPPA Stark

## 2022-04-27 NOTE — BRIEF OPERATIVE NOTE - NSICDXBRIEFPROCEDURE_GEN_ALL_CORE_FT
PROCEDURES:  Robot-assisted repair of hiatal hernia using mesh 27-Apr-2022 17:14:01  Barbara Mai  Robot-assisted [place] fundoplication 27-Apr-2022 17:14:12  Barbara Mai  Robot-assisted cholecystectomy 27-Apr-2022 17:14:24  Barbara Mai

## 2022-04-27 NOTE — BRIEF OPERATIVE NOTE - OPERATION/FINDINGS
Steep reverse Trendelenburg. Liver retractor placed. Dissected around posterior esophagus through avascular plane taking care to preserve vagus nerve and avoid aorta. Short gastrics ligated from fundus with vessel sealer. Dissection extended through hiatus into mediastinum. EGD revealing 5cm intra-abdominal esophagus. Mesh introduced to abdomen and placed posterior to esophagus overlying hiatus. Mesh sutured into placed using ethibond. Shoeshine maneuver of fundus around esophagus at LES. Created 270 degree posterior wrap and sutured in place. Attention turned to gallbladder.     Adhesions bluntly dissected from gallbladder. GB fundus retracted superiorly over dome of liver. Filmy adhesions between the GB & omentum/duo ligated using vessel sealer. GB infundibulum retracted laterally towards RLQ exposing Calot’s triangle. Critical view of safety obtained. Cystic artery ligated using vessel sealer. Cystic duct clipped and divided. Peritoneal attachments between GB & liver bed  w/ cautery. Hemostasis achieved. No leakage of bile from cystic duct stump.

## 2022-04-27 NOTE — PROGRESS NOTE ADULT - ASSESSMENT
56F PMH endometriosis s/p laparoscopic fulgurations x2, GERD, IBS-C, recently seen in ED 2/13/22 for acute on chronic epigastric pain and found to have gallstones and dilated CBD, offered surgery but chose to defer for outpatient management presents today for elective robotic cholecystectomy and hiatal hernia repair.     pain/nausea control  CLD/IVF  cef/flagyl   SCD/SQH  IS/OOB  AM labs

## 2022-04-27 NOTE — BRIEF OPERATIVE NOTE - NSICDXBRIEFPREOP_GEN_ALL_CORE_FT
PRE-OP DIAGNOSIS:  Symptomatic cholelithiasis 27-Apr-2022 17:14:35  Barbara Mai  Hiatal hernia with GERD 27-Apr-2022 17:14:41  Barbara Mai

## 2022-04-28 ENCOUNTER — TRANSCRIPTION ENCOUNTER (OUTPATIENT)
Age: 57
End: 2022-04-28

## 2022-04-28 VITALS
HEART RATE: 77 BPM | RESPIRATION RATE: 18 BRPM | DIASTOLIC BLOOD PRESSURE: 77 MMHG | OXYGEN SATURATION: 97 % | TEMPERATURE: 98 F | SYSTOLIC BLOOD PRESSURE: 127 MMHG

## 2022-04-28 LAB
ALBUMIN SERPL ELPH-MCNC: 3.7 G/DL — SIGNIFICANT CHANGE UP (ref 3.3–5)
ALP SERPL-CCNC: 154 U/L — HIGH (ref 40–120)
ALT FLD-CCNC: 419 U/L — HIGH (ref 10–45)
ANION GAP SERPL CALC-SCNC: 10 MMOL/L — SIGNIFICANT CHANGE UP (ref 5–17)
AST SERPL-CCNC: 883 U/L — HIGH (ref 10–40)
BILIRUB DIRECT SERPL-MCNC: 0.2 MG/DL — SIGNIFICANT CHANGE UP (ref 0–0.3)
BILIRUB INDIRECT FLD-MCNC: 0.4 MG/DL — SIGNIFICANT CHANGE UP (ref 0.2–1)
BILIRUB SERPL-MCNC: 0.6 MG/DL — SIGNIFICANT CHANGE UP (ref 0.2–1.2)
BILIRUB SERPL-MCNC: 0.6 MG/DL — SIGNIFICANT CHANGE UP (ref 0.2–1.2)
BUN SERPL-MCNC: 8 MG/DL — SIGNIFICANT CHANGE UP (ref 7–23)
CALCIUM SERPL-MCNC: 8.9 MG/DL — SIGNIFICANT CHANGE UP (ref 8.4–10.5)
CHLORIDE SERPL-SCNC: 104 MMOL/L — SIGNIFICANT CHANGE UP (ref 96–108)
CO2 SERPL-SCNC: 23 MMOL/L — SIGNIFICANT CHANGE UP (ref 22–31)
CREAT SERPL-MCNC: 0.75 MG/DL — SIGNIFICANT CHANGE UP (ref 0.5–1.3)
EGFR: 92 ML/MIN/1.73M2 — SIGNIFICANT CHANGE UP
GLUCOSE SERPL-MCNC: 140 MG/DL — HIGH (ref 70–99)
HCT VFR BLD CALC: 43.9 % — SIGNIFICANT CHANGE UP (ref 34.5–45)
HGB BLD-MCNC: 14 G/DL — SIGNIFICANT CHANGE UP (ref 11.5–15.5)
INR BLD: 1.2 — HIGH (ref 0.88–1.16)
MAGNESIUM SERPL-MCNC: 1.8 MG/DL — SIGNIFICANT CHANGE UP (ref 1.6–2.6)
MCHC RBC-ENTMCNC: 29.2 PG — SIGNIFICANT CHANGE UP (ref 27–34)
MCHC RBC-ENTMCNC: 31.9 GM/DL — LOW (ref 32–36)
MCV RBC AUTO: 91.6 FL — SIGNIFICANT CHANGE UP (ref 80–100)
MELD SCORE WITH DIALYSIS: 22 POINTS — SIGNIFICANT CHANGE UP
MELD SCORE WITHOUT DIALYSIS: 8 POINTS — SIGNIFICANT CHANGE UP
NRBC # BLD: 0 /100 WBCS — SIGNIFICANT CHANGE UP (ref 0–0)
PHOSPHATE SERPL-MCNC: 3.1 MG/DL — SIGNIFICANT CHANGE UP (ref 2.5–4.5)
PLATELET # BLD AUTO: 200 K/UL — SIGNIFICANT CHANGE UP (ref 150–400)
POTASSIUM SERPL-MCNC: 4.5 MMOL/L — SIGNIFICANT CHANGE UP (ref 3.5–5.3)
POTASSIUM SERPL-SCNC: 4.5 MMOL/L — SIGNIFICANT CHANGE UP (ref 3.5–5.3)
PROT SERPL-MCNC: 6.5 G/DL — SIGNIFICANT CHANGE UP (ref 6–8.3)
PROTHROM AB SERPL-ACNC: 14.3 SEC — HIGH (ref 10.5–13.4)
RBC # BLD: 4.79 M/UL — SIGNIFICANT CHANGE UP (ref 3.8–5.2)
RBC # FLD: 13.7 % — SIGNIFICANT CHANGE UP (ref 10.3–14.5)
SODIUM SERPL-SCNC: 137 MMOL/L — SIGNIFICANT CHANGE UP (ref 135–145)
WBC # BLD: 11.75 K/UL — HIGH (ref 3.8–10.5)
WBC # FLD AUTO: 11.75 K/UL — HIGH (ref 3.8–10.5)

## 2022-04-28 PROCEDURE — C1781: CPT

## 2022-04-28 PROCEDURE — S2900: CPT

## 2022-04-28 PROCEDURE — 86850 RBC ANTIBODY SCREEN: CPT

## 2022-04-28 PROCEDURE — 86900 BLOOD TYPING SEROLOGIC ABO: CPT

## 2022-04-28 PROCEDURE — 84100 ASSAY OF PHOSPHORUS: CPT

## 2022-04-28 PROCEDURE — 86901 BLOOD TYPING SEROLOGIC RH(D): CPT

## 2022-04-28 PROCEDURE — 36415 COLL VENOUS BLD VENIPUNCTURE: CPT

## 2022-04-28 PROCEDURE — 85027 COMPLETE CBC AUTOMATED: CPT

## 2022-04-28 PROCEDURE — C1889: CPT

## 2022-04-28 PROCEDURE — 80076 HEPATIC FUNCTION PANEL: CPT

## 2022-04-28 PROCEDURE — 88304 TISSUE EXAM BY PATHOLOGIST: CPT

## 2022-04-28 PROCEDURE — 80048 BASIC METABOLIC PNL TOTAL CA: CPT

## 2022-04-28 PROCEDURE — 83735 ASSAY OF MAGNESIUM: CPT

## 2022-04-28 RX ORDER — OXYCODONE HYDROCHLORIDE 5 MG/1
5 TABLET ORAL
Qty: 80 | Refills: 0
Start: 2022-04-28 | End: 2022-05-01

## 2022-04-28 RX ORDER — MAGNESIUM SULFATE 500 MG/ML
2 VIAL (ML) INJECTION ONCE
Refills: 0 | Status: COMPLETED | OUTPATIENT
Start: 2022-04-28 | End: 2022-04-28

## 2022-04-28 RX ORDER — ACETAMINOPHEN 500 MG
10 TABLET ORAL
Qty: 160 | Refills: 0
Start: 2022-04-28 | End: 2022-05-01

## 2022-04-28 RX ADMIN — Medication 25 GRAM(S): at 09:50

## 2022-04-28 RX ADMIN — Medication 100 MILLIGRAM(S): at 05:57

## 2022-04-28 RX ADMIN — OXYCODONE HYDROCHLORIDE 5 MILLIGRAM(S): 5 TABLET ORAL at 11:28

## 2022-04-28 NOTE — DISCHARGE NOTE NURSING/CASE MANAGEMENT/SOCIAL WORK - PATIENT PORTAL LINK FT
You can access the FollowMyHealth Patient Portal offered by [facility] by registering at the following website: [email or web address]. By joining [facility]’s FollowMyHealth portal, you will also be able to view your health information using other applications (apps) compatible with our system.

## 2022-04-28 NOTE — DISCHARGE NOTE PROVIDER - NSDCMRMEDTOKEN_GEN_ALL_CORE_FT
acetaminophen 160 mg/5 mL oral liquid: 10 milliliter(s) orally every 6 hours, As Needed -for moderate pain MDD:40 mL

## 2022-04-28 NOTE — DISCHARGE NOTE PROVIDER - NSDCFUSCHEDAPPT_GEN_ALL_CORE_FT
Keith Lewis  [facility]  Surg Gen [address]  Scheduled Appointment: 05/05/2022

## 2022-04-28 NOTE — DISCHARGE NOTE PROVIDER - HOSPITAL COURSE
56F PMH endometriosis s/p laparoscopic fulgurations x2, GERD, IBS-C, recently seen in ED 2/13/22 for acute on chronic epigastric pain and found to have gallstones and dilated CBD, offered surgery but chose to defer for outpatient management presents today for elective robotic cholecystectomy and hiatal hernia repair. Pt tolerated the procedure well. At time of discharge, pt was tolerating a r liquid diet, and pt's pain was controlled. Plan is to follow up with Dr. Lewis in the office.

## 2022-04-28 NOTE — DISCHARGE NOTE PROVIDER - NSDCFUADDINST_GEN_ALL_CORE_FT
56F PMH endometriosis s/p laparoscopic fulgurations x2, GERD, IBS-C, recently seen in ED 2/13/22 for acute on chronic epigastric pain and found to have gallstones and dilated CBD, offered surgery but chose to defer for outpatient management presents today for elective robotic cholecystectomy and hiatal hernia repair. Pt tolerated the procedure well. At time of discharge, pt was tolerating a r liquid diet, and pt's pain was controlled. Plan is to follow up with Dr. Lewis in the office.        - Continue diet as directed.   -Activity: no heavy lifting or strenuous exercise for one month.  -You may shower but no soaking baths, no swimming pools.  -Notify physician for fever greater than 101, worsening abdominal pain, bleeding or drainage from incision sites.   -Follow-up with Dr. Lewis  in 1 week. Call the office to make an appointment.

## 2022-04-28 NOTE — PROGRESS NOTE ADULT - ASSESSMENT
56F PMH endometriosis s/p laparoscopic fulgurations x2, GERD, IBS-C, recently seen in ED 2/13/22 for acute on chronic epigastric pain and found to have gallstones and dilated CBD, offered surgery but chose to defer for outpatient management presents today for elective robotic cholecystectomy and hiatal hernia repair.     pain/nausea control  CLD/IVF  cef/flagyl   SCD/SQH  IS/OOB  AM labs  possible dc this afternoon

## 2022-04-28 NOTE — DISCHARGE NOTE PROVIDER - NSDCCPGOAL_GEN_ALL_CORE_FT
To get better and follow your care plan as instructed.  - Continue diet as directed.   -Activity: no heavy lifting or strenuous exercise for one month.  -You may shower but no soaking baths, no swimming pools.  -Notify physician for fever greater than 101, worsening abdominal pain, bleeding or drainage from incision sites.   -Follow-up with Dr. Lewis  in 1 week. Call the office to make an appointment.

## 2022-04-28 NOTE — DISCHARGE NOTE PROVIDER - NSDCCPCAREPLAN_GEN_ALL_CORE_FT
PRINCIPAL DISCHARGE DIAGNOSIS  Diagnosis: Hiatal hernia  Assessment and Plan of Treatment:   56F PMH endometriosis s/p laparoscopic fulgurations x2, GERD, IBS-C, recently seen in ED 2/13/22 for acute on chronic epigastric pain and found to have gallstones and dilated CBD, offered surgery but chose to defer for outpatient management presents today for elective robotic cholecystectomy and hiatal hernia repair. Pt tolerated the procedure well. At time of discharge, pt was tolerating a r liquid diet, and pt's pain was controlled. Plan is to follow up with Dr. Lewis in the office.  - Continue diet as directed.   -Activity: no heavy lifting or strenuous exercise for one month.  -You may shower but no soaking baths, no swimming pools.  -Notify physician for fever greater than 101, worsening abdominal pain, bleeding or drainage from incision sites.   -Follow-up with Dr. Lewis  in 1 week. Call the office to make an appointment.

## 2022-04-28 NOTE — DISCHARGE NOTE NURSING/CASE MANAGEMENT/SOCIAL WORK - NSDCPEFALRISK_GEN_ALL_CORE
For information on Fall & Injury Prevention, visit: [email or web address] OR  [email or web address] OR  https://www.cdc.gov/steadi/patient.html

## 2022-04-28 NOTE — DISCHARGE NOTE PROVIDER - CARE PROVIDER_API CALL
Keith Lewis (MD)  Surgery  [address] [address]  Phone: [phone]  Fax: [phone]  Scheduled Appointment: 05/05/2022

## 2022-04-28 NOTE — PROGRESS NOTE ADULT - SUBJECTIVE AND OBJECTIVE BOX
Procedure: MARVIN nam, HHR and toupet fundoplication  Surgeon: Dr. Lewis    S: Pt has no complaints. Denies CP, SOB, TORRES, calf tenderness. Pain controlled with medication.    O:  T(C): 36.1 (04-27-22 @ 17:25), Max: 36.1 (04-27-22 @ 17:25)  T(F): 97 (04-27-22 @ 17:25), Max: 97 (04-27-22 @ 17:25)  HR: 50 (04-27-22 @ 18:43) (50 - 76)  BP: 124/80 (04-27-22 @ 18:43) (102/66 - 149/81)  RR: 20 (04-27-22 @ 18:43) (12 - 22)  SpO2: 100% (04-27-22 @ 18:43) (100% - 100%)  Wt(kg): --        Gen: NAD, resting comfortably in bed  C/V: NSR  Pulm: Nonlabored breathing, no respiratory distress  Abd: abdomen is soft, nontender, non distended, no rebound or guarding, incisions are clean dry and intact  Extrem: WWP, no calf edema, SCDs in place    
INTERVAL HPI/OVERNIGHT EVENTS: poc wnl, retaining per PVR, payam, vss    STATUS POST:  robotic assisted cholecystectomy and hiatal hernia repair with mesh, toupet fundoplication    POST OPERATIVE DAY #: 1    SUBJECTIVE:  pt seen at bedside, feeling much better. complains of some abdominal pain, attributing to gas. ambulating. tolerating liquid diet    MEDICATIONS  (STANDING):  cefTRIAXone   IVPB 1000 milliGRAM(s) IV Intermittent every 24 hours  lactated ringers. 1000 milliLiter(s) (120 mL/Hr) IV Continuous <Continuous>  metroNIDAZOLE  IVPB 500 milliGRAM(s) IV Intermittent every 8 hours    MEDICATIONS  (PRN):  HYDROmorphone  Injectable 0.5 milliGRAM(s) IV Push every 30 minutes PRN for severe breakthrough pain  ondansetron Injectable 4 milliGRAM(s) IV Push every 6 hours PRN Nausea and/or Vomiting  oxyCODONE    Solution 5 milliGRAM(s) Oral every 6 hours PRN Moderate Pain (4 - 6)  oxyCODONE    Solution 10 milliGRAM(s) Oral every 6 hours PRN Severe Pain (7 - 10)      Vital Signs Last 24 Hrs  T(C): 36.3 (28 Apr 2022 04:59), Max: 37.1 (27 Apr 2022 23:54)  T(F): 97.4 (28 Apr 2022 04:59), Max: 98.7 (27 Apr 2022 23:54)  HR: 64 (28 Apr 2022 04:59) (50 - 76)  BP: 157/87 (28 Apr 2022 04:59) (102/66 - 157/87)  BP(mean): 89 (27 Apr 2022 20:30) (79 - 110)  RR: 18 (28 Apr 2022 04:59) (12 - 22)  SpO2: 96% (28 Apr 2022 04:59) (96% - 100%)    PHYSICAL EXAM:      Constitutional: A&Ox3    Respiratory: non labored breathing, no respiratory distress    Cardiovascular: NSR, RRR    Gastrointestinal: soft, nondistended, mild incisional tenderness, incisions c/d/i    Extremities: (-) edema                  I&O's Detail    27 Apr 2022 07:01  -  28 Apr 2022 07:00  --------------------------------------------------------  IN:    Lactated Ringers: 1680 mL    Oral Fluid: 240 mL  Total IN: 1920 mL    OUT:    Voided (mL): 1400 mL  Total OUT: 1400 mL    Total NET: 520 mL          LABS:                RADIOLOGY & ADDITIONAL STUDIES:

## 2022-05-05 ENCOUNTER — APPOINTMENT (OUTPATIENT)
Dept: SURGERY | Facility: CLINIC | Age: 57
End: 2022-05-05
Payer: COMMERCIAL

## 2022-05-05 VITALS
BODY MASS INDEX: 25.03 KG/M2 | TEMPERATURE: 97.6 F | WEIGHT: 169 LBS | SYSTOLIC BLOOD PRESSURE: 129 MMHG | HEART RATE: 64 BPM | HEIGHT: 69 IN | DIASTOLIC BLOOD PRESSURE: 73 MMHG | OXYGEN SATURATION: 98 %

## 2022-05-05 PROCEDURE — 99024 POSTOP FOLLOW-UP VISIT: CPT

## 2022-05-09 ENCOUNTER — NON-APPOINTMENT (OUTPATIENT)
Age: 57
End: 2022-05-09

## 2022-05-11 DIAGNOSIS — K21.9 GASTRO-ESOPHAGEAL REFLUX DISEASE WITHOUT ESOPHAGITIS: ICD-10-CM

## 2022-05-11 DIAGNOSIS — K80.00 CALCULUS OF GALLBLADDER WITH ACUTE CHOLECYSTITIS WITHOUT OBSTRUCTION: ICD-10-CM

## 2022-05-11 DIAGNOSIS — K58.9 IRRITABLE BOWEL SYNDROME WITHOUT DIARRHEA: ICD-10-CM

## 2022-05-11 DIAGNOSIS — K44.9 DIAPHRAGMATIC HERNIA WITHOUT OBSTRUCTION OR GANGRENE: ICD-10-CM

## 2022-05-16 LAB — SURGICAL PATHOLOGY STUDY: SIGNIFICANT CHANGE UP

## 2022-11-17 NOTE — ED ADULT NURSE NOTE - NURSING ED SKIN COLOR
normal for race Rotation Flap Text: The defect edges were debeveled with a #15 scalpel blade.  Given the location of the defect, shape of the defect and the proximity to free margins a rotation flap was deemed most appropriate.  Using a sterile surgical marker, an appropriate rotation flap was drawn incorporating the defect and placing the expected incisions within the relaxed skin tension lines where possible.    The area thus outlined was incised deep to adipose tissue with a #15 scalpel blade.  The skin margins were undermined to an appropriate distance in all directions utilizing iris scissors.

## 2023-08-11 NOTE — PACU DISCHARGE NOTE - NSCLINEINSERTRD_GEN_ALL_CORE
No Full Thickness Lip Wedge Repair (Flap) Text: In order to maintain form and function of the lip, and to avoid distortion of the free margin, a lip advancement flap was planned. After prep and local anesthesia, Burow??????s triangles were excised superiorly to the nasal sill and inferiorly around the lip to the labial mucosa.  Two flaps were elevated by undermining the skin laterally in both directions,  the skin and mucosa from the orbicularis muscle.  Any redundant orbicularis oris muscle was removed and complimentary edges of remaining muscle reapposed with a horizontal mattress stitch.  After hemostasis was obtained, the flaps were advanced and closed in a layered fashion.

## (undated) DEVICE — XI ENDOWRIST SUCTION IRRIGATOR 8MM

## (undated) DEVICE — GOWN XL

## (undated) DEVICE — INZII RETRIEVAL SYSTEM 5MM

## (undated) DEVICE — TROCAR COVIDIEN VERSAPORT BLADELESS OPTICAL 12MM STANDARD

## (undated) DEVICE — Device

## (undated) DEVICE — NDL HYPO SAFE 22G X 1.5" (BLACK)

## (undated) DEVICE — XI VESSEL SEALER

## (undated) DEVICE — INSUFFLATION NDL COVIDIEN SURGINEEDLE VERESS 150MM LONG

## (undated) DEVICE — WARMING BLANKET LOWER ADULT

## (undated) DEVICE — TUBING ERBE CO2 OLYMPUS CONNECTOR

## (undated) DEVICE — SUT VICRYL 0 27" UR-6

## (undated) DEVICE — GLV 7.5 PROTEXIS (WHITE)

## (undated) DEVICE — VENODYNE/SCD SLEEVE CALF MEDIUM

## (undated) DEVICE — PACK GENERAL LAPAROSCOPY

## (undated) DEVICE — BLADE SCALPEL SAFETYLOCK #15

## (undated) DEVICE — POSITIONER PINK PAD PIGAZZI SYSTEM FULL KIT

## (undated) DEVICE — SUT MONOCRYL 4-0 27" PS-2 UNDYED

## (undated) DEVICE — XI OBTURATOR OPTICAL BLADELESS 8MM

## (undated) DEVICE — KIT ENDO PROCEDURE CUST W/VLV

## (undated) DEVICE — SUT STRATAFIX SPIRAL PDO 2-0 10CM SH VIOLET

## (undated) DEVICE — XI DRAPE ARM

## (undated) DEVICE — SYR LUER LOK 20CC

## (undated) DEVICE — D HELP - CLEARVIEW CLEARIFY SYSTEM

## (undated) DEVICE — XI DRAPE COLUMN

## (undated) DEVICE — MARKING PEN W RULER

## (undated) DEVICE — SUT ETHIBOND 2-0 36" SH

## (undated) DEVICE — TUBING HYBRID CO2

## (undated) DEVICE — DRSG GAUZE PACKTNER ROLL

## (undated) DEVICE — XI SEAL UNIV 5- 8 MM

## (undated) DEVICE — TROCAR SURGIQUEST AIRSEAL 5MM X 100MM

## (undated) DEVICE — DRAIN PENROSE 5/8" X 18" LATEX

## (undated) DEVICE — TUBING AIRSEAL TRI-LUMEN FILTERED